# Patient Record
Sex: MALE | Race: WHITE | Employment: OTHER | URBAN - METROPOLITAN AREA
[De-identification: names, ages, dates, MRNs, and addresses within clinical notes are randomized per-mention and may not be internally consistent; named-entity substitution may affect disease eponyms.]

---

## 2024-02-29 ENCOUNTER — LAB ENCOUNTER (OUTPATIENT)
Dept: LAB | Facility: REFERENCE LAB | Age: 64
End: 2024-02-29
Attending: FAMILY MEDICINE
Payer: COMMERCIAL

## 2024-02-29 ENCOUNTER — OFFICE VISIT (OUTPATIENT)
Facility: CLINIC | Age: 64
End: 2024-02-29
Payer: COMMERCIAL

## 2024-02-29 VITALS
HEART RATE: 43 BPM | DIASTOLIC BLOOD PRESSURE: 70 MMHG | OXYGEN SATURATION: 98 % | BODY MASS INDEX: 25.61 KG/M2 | SYSTOLIC BLOOD PRESSURE: 108 MMHG | HEIGHT: 75 IN | WEIGHT: 206 LBS

## 2024-02-29 DIAGNOSIS — I83.90 VARICOSE VEINS: ICD-10-CM

## 2024-02-29 DIAGNOSIS — Z00.00 PHYSICAL EXAM, ANNUAL: ICD-10-CM

## 2024-02-29 DIAGNOSIS — Z87.891 FORMER SMOKER: ICD-10-CM

## 2024-02-29 DIAGNOSIS — Z00.00 PHYSICAL EXAM, ANNUAL: Primary | ICD-10-CM

## 2024-02-29 LAB
ALBUMIN SERPL-MCNC: 4.3 G/DL (ref 3.2–4.8)
ALBUMIN/GLOB SERPL: 1.4 {RATIO} (ref 1–2)
ALP LIVER SERPL-CCNC: 67 U/L
ALT SERPL-CCNC: 29 U/L
ANION GAP SERPL CALC-SCNC: 5 MMOL/L (ref 0–18)
AST SERPL-CCNC: 27 U/L (ref ?–34)
BASOPHILS # BLD AUTO: 0.06 X10(3) UL (ref 0–0.2)
BASOPHILS NFR BLD AUTO: 1.1 %
BILIRUB SERPL-MCNC: 0.6 MG/DL (ref 0.2–1.1)
BUN BLD-MCNC: 20 MG/DL (ref 9–23)
BUN/CREAT SERPL: 16.3 (ref 10–20)
CALCIUM BLD-MCNC: 10.2 MG/DL (ref 8.7–10.4)
CHLORIDE SERPL-SCNC: 109 MMOL/L (ref 98–112)
CHOLEST SERPL-MCNC: 142 MG/DL (ref ?–200)
CO2 SERPL-SCNC: 28 MMOL/L (ref 21–32)
COMPLEXED PSA SERPL-MCNC: 1.81 NG/ML (ref ?–4)
CREAT BLD-MCNC: 1.23 MG/DL
DEPRECATED RDW RBC AUTO: 44.5 FL (ref 35.1–46.3)
EGFRCR SERPLBLD CKD-EPI 2021: 66 ML/MIN/1.73M2 (ref 60–?)
EOSINOPHIL # BLD AUTO: 0.32 X10(3) UL (ref 0–0.7)
EOSINOPHIL NFR BLD AUTO: 5.8 %
ERYTHROCYTE [DISTWIDTH] IN BLOOD BY AUTOMATED COUNT: 12.6 % (ref 11–15)
EST. AVERAGE GLUCOSE BLD GHB EST-MCNC: 97 MG/DL (ref 68–126)
FASTING PATIENT LIPID ANSWER: NO
FASTING STATUS PATIENT QL REPORTED: NO
GLOBULIN PLAS-MCNC: 3.1 G/DL (ref 2.8–4.4)
GLUCOSE BLD-MCNC: 63 MG/DL (ref 70–99)
HBA1C MFR BLD: 5 % (ref ?–5.7)
HCT VFR BLD AUTO: 44.9 %
HDLC SERPL-MCNC: 35 MG/DL (ref 40–59)
HGB BLD-MCNC: 15.2 G/DL
IMM GRANULOCYTES # BLD AUTO: 0 X10(3) UL (ref 0–1)
IMM GRANULOCYTES NFR BLD: 0 %
LDLC SERPL CALC-MCNC: 92 MG/DL (ref ?–100)
LYMPHOCYTES # BLD AUTO: 1.47 X10(3) UL (ref 1–4)
LYMPHOCYTES NFR BLD AUTO: 26.8 %
MCH RBC QN AUTO: 32.6 PG (ref 26–34)
MCHC RBC AUTO-ENTMCNC: 33.9 G/DL (ref 31–37)
MCV RBC AUTO: 96.4 FL
MONOCYTES # BLD AUTO: 0.43 X10(3) UL (ref 0.1–1)
MONOCYTES NFR BLD AUTO: 7.8 %
NEUTROPHILS # BLD AUTO: 3.21 X10 (3) UL (ref 1.5–7.7)
NEUTROPHILS # BLD AUTO: 3.21 X10(3) UL (ref 1.5–7.7)
NEUTROPHILS NFR BLD AUTO: 58.5 %
NONHDLC SERPL-MCNC: 107 MG/DL (ref ?–130)
OSMOLALITY SERPL CALC.SUM OF ELEC: 295 MOSM/KG (ref 275–295)
PLATELET # BLD AUTO: 150 10(3)UL (ref 150–450)
POTASSIUM SERPL-SCNC: 4.7 MMOL/L (ref 3.5–5.1)
PROT SERPL-MCNC: 7.4 G/DL (ref 5.7–8.2)
RBC # BLD AUTO: 4.66 X10(6)UL
SODIUM SERPL-SCNC: 142 MMOL/L (ref 136–145)
TRIGL SERPL-MCNC: 73 MG/DL (ref 30–149)
VLDLC SERPL CALC-MCNC: 12 MG/DL (ref 0–30)
WBC # BLD AUTO: 5.5 X10(3) UL (ref 4–11)

## 2024-02-29 PROCEDURE — 99386 PREV VISIT NEW AGE 40-64: CPT | Performed by: FAMILY MEDICINE

## 2024-02-29 PROCEDURE — 85025 COMPLETE CBC W/AUTO DIFF WBC: CPT | Performed by: FAMILY MEDICINE

## 2024-02-29 PROCEDURE — 80053 COMPREHEN METABOLIC PANEL: CPT | Performed by: FAMILY MEDICINE

## 2024-02-29 PROCEDURE — 83036 HEMOGLOBIN GLYCOSYLATED A1C: CPT | Performed by: FAMILY MEDICINE

## 2024-02-29 PROCEDURE — 3074F SYST BP LT 130 MM HG: CPT | Performed by: FAMILY MEDICINE

## 2024-02-29 PROCEDURE — 3078F DIAST BP <80 MM HG: CPT | Performed by: FAMILY MEDICINE

## 2024-02-29 PROCEDURE — 3008F BODY MASS INDEX DOCD: CPT | Performed by: FAMILY MEDICINE

## 2024-02-29 PROCEDURE — 80061 LIPID PANEL: CPT | Performed by: FAMILY MEDICINE

## 2024-02-29 PROCEDURE — 84153 ASSAY OF PSA TOTAL: CPT | Performed by: FAMILY MEDICINE

## 2024-02-29 NOTE — PROGRESS NOTES
Riccardo Louis is a 63 year old male who presents for a complete physical exam.   HPI:     Left upper arm growth: Has seen derm in the past.     Has pronounced bulging veins on left leg, and lesser on right leg. Thinking about seeing specialist.     Concerns: above  Last colonoscopy:  2015 at [facility] and was rec'd to repeat in 10 years per patient.   Last PSA: 1.91 in 2023  Diet/exercise: Exercises regularly.   Vaccines- Tdap: 2023 , Shingles: to consider , PCV20: Never, Covid: to consider booster    REVIEW OF SYSTEMS:   GENERAL: feels well otherwise   SKIN: denies any unusual skin lesions  EYES:denies vision change  HEENT: no hearing changes, negative  LUNGS: denies shortness of breath with exertion  CARDIOVASCULAR: denies chest pain on exertion  GI: denies abdominal pain, constipation or diarrhea. No hematochezia or melena  : denies nocturia or changes in stream  NEURO: denies headaches  PSYCHE: denies depression or anxiety    No current outpatient medications on file.      Past Medical History:   Diagnosis Date    Anxiety     Former smoker       Past Surgical History:   Procedure Laterality Date    SKIN SURGERY        History reviewed. No pertinent family history.   Social History:  Social History     Socioeconomic History    Marital status:    Tobacco Use    Smoking status: Former     Packs/day: 1.00     Years: 25.00     Additional pack years: 0.00     Total pack years: 25.00     Types: Cigarettes     Quit date:      Years since quittin.1    Smokeless tobacco: Never    Tobacco comments:     Occasionally smoke cigars   Vaping Use    Vaping Use: Never used   Substance and Sexual Activity    Alcohol use: Never    Drug use: Never   Other Topics Concern    Caffeine Concern No    Exercise No    Seat Belt No    Special Diet No    Stress Concern No    Weight Concern No           EXAM:     Wt Readings from Last 6 Encounters:   24 206 lb (93.4 kg)     Body mass index is 25.75 kg/m².     /70   Pulse (!) 43   Ht 6' 3\" (1.905 m)   Wt 206 lb (93.4 kg)   SpO2 98%   BMI 25.75 kg/m²      GENERAL: well developed, well nourished, in no apparent distress  SKIN: no rashes, no suspicious lesions  HEENT: atraumatic, normocephalic, MMM, nose normal, Tms & EACs normal  EYES: PERRLA, EOMI, no conjunctival injection  NECK: supple, no adenopathy   LUNGS: CTA b/l, no w/r/r  CARDIO: RRR without murmur  GI: normoactive bowel sounds, NT/ND, no masses, no HSM  EXTREMITIES: no cyanosis, clubbing or edema. Large varicose veins on left leg and moreso left thigh.   NEURO: Alert & oriented, motor and sensory are grossly intact    No results found for: \"CHOLEST\", \"HDL\", \"LDL\", \"TRIGLY\", \"AST\", \"ALT\"   ASSESSMENT AND PLAN:   Riccardo Louis is a 63 year old male who presents for a complete physical exam.    Encounter Diagnoses   Name Primary?    Physical exam, annual Yes    Varicose veins     Former smoker      Orders Placed This Encounter   Procedures    Hemoglobin A1C    CBC With Differential With Platelet    Comp Metabolic Panel (14)    Lipid Panel    PSA Total, Screen       -Varicose veins: Recommend vascular surgery evaluation. Referral generated. Reviewed conservative measures as well: avoid prolonged sitting/standing, elevate legs when seated, limit salt intake, compression stockings.  -Baseline labs ordered.  -To consider Shingrix & PCV20.   -Plan for AAA screening next year.   -Medical record release form signed today to obtain colonoscopy records from [place].       Discussed with patient the following:  -Risks and benefits of screening for prostate cancer:  -Colon cancer screening   -Screening for lung cancer: Does not qualify  -Healthy diet including adequate intake of vegetables and fruits, appropriate portion sizes, minimizing highly concentrated carbohydrate foods  -Exercising 30 minutes a day most days of the week   -Importance of regular exercise and weight maintenance/loss  -Diabetes screening    -Cholesterol screening   -Recommendation for yearly influenza vaccine  -Need for Tdap once as an adult and Td booster every 10 years  -Need for Zoster vaccine for patients >= 50     Meds & Refills for this Visit:  Requested Prescriptions      No prescriptions requested or ordered in this encounter       Imaging & Consults:  VASCULAR SURGERY - INTERNAL    Return in 1 year for annual physical or sooner as needed.     Jonathan Armenta DO  02/29/24   10:35 AM

## 2024-03-06 ENCOUNTER — TELEPHONE (OUTPATIENT)
Facility: CLINIC | Age: 64
End: 2024-03-06

## 2024-03-07 ENCOUNTER — OFFICE VISIT (OUTPATIENT)
Facility: CLINIC | Age: 64
End: 2024-03-07
Payer: COMMERCIAL

## 2024-03-07 DIAGNOSIS — I83.813 VARICOSE VEINS OF BILATERAL LOWER EXTREMITIES WITH PAIN: Primary | ICD-10-CM

## 2024-03-07 NOTE — PATIENT INSTRUCTIONS
Please call and make an appointment for your compression stockings. The list below are different vendors who have compression stockings available. The ones with asterisks accept insurance.

## 2024-03-27 ENCOUNTER — HOSPITAL ENCOUNTER (OUTPATIENT)
Dept: ULTRASOUND IMAGING | Facility: HOSPITAL | Age: 64
Discharge: HOME | End: 2024-03-27
Payer: COMMERCIAL

## 2024-03-27 DIAGNOSIS — I83.813 VARICOSE VEINS OF BILATERAL LOWER EXTREMITIES WITH PAIN: ICD-10-CM

## 2024-03-27 PROCEDURE — 93970 EXTREMITY STUDY: CPT

## 2024-04-01 ENCOUNTER — TELEPHONE (OUTPATIENT)
Facility: CLINIC | Age: 64
End: 2024-04-01

## 2024-04-01 ENCOUNTER — OFFICE VISIT (OUTPATIENT)
Facility: CLINIC | Age: 64
End: 2024-04-01
Payer: COMMERCIAL

## 2024-04-01 VITALS
WEIGHT: 206 LBS | HEART RATE: 62 BPM | BODY MASS INDEX: 26 KG/M2 | SYSTOLIC BLOOD PRESSURE: 100 MMHG | DIASTOLIC BLOOD PRESSURE: 68 MMHG | TEMPERATURE: 97 F | RESPIRATION RATE: 16 BRPM | OXYGEN SATURATION: 97 %

## 2024-04-01 DIAGNOSIS — L72.9 SCALP CYST: Primary | ICD-10-CM

## 2024-04-01 PROCEDURE — 99213 OFFICE O/P EST LOW 20 MIN: CPT | Performed by: FAMILY MEDICINE

## 2024-04-01 PROCEDURE — 3078F DIAST BP <80 MM HG: CPT | Performed by: FAMILY MEDICINE

## 2024-04-01 PROCEDURE — 3074F SYST BP LT 130 MM HG: CPT | Performed by: FAMILY MEDICINE

## 2024-04-01 NOTE — PROGRESS NOTES
HPI:    Patient ID: Riccardo Louis is a 63 year old male who presents for growth on scalp.    HPI  Has cyst on vertex of scalp that has been there for years.   Recently noticed slight increase in size.   No pain.   No associated symptoms.     Past Medical History:   Diagnosis Date    Anxiety 1982    Former smoker         No current outpatient medications on file.        No Known Allergies    Review of Systems   Constitutional: Negative.    Respiratory: Negative.     Cardiovascular: Negative.    Gastrointestinal: Negative.    Neurological: Negative.    All other systems reviewed and are negative.           /68   Pulse 62   Temp 97.4 °F (36.3 °C) (Oral)   Resp 16   Wt 206 lb (93.4 kg)   SpO2 97%   BMI 25.75 kg/m²     PHYSICAL EXAM:   Physical Exam  Constitutional:       General: He is not in acute distress.     Appearance: Normal appearance. He is not ill-appearing, toxic-appearing or diaphoretic.   HENT:      Head: Normocephalic and atraumatic.      Comments: Roughly 7mm diameter round well-demarcated mobile nontender nodule on vertex of scalp. No overlying skin changes.   Pulmonary:      Effort: Pulmonary effort is normal.   Neurological:      General: No focal deficit present.      Mental Status: He is alert.   Psychiatric:         Mood and Affect: Mood normal.         Behavior: Behavior normal.         Thought Content: Thought content normal.         Judgment: Judgment normal.             ASSESSMENT/PLAN:     Encounter Diagnosis   Name Primary?    Scalp cyst Yes       1. Scalp cyst    -Likely benign thus reassurance provided.   -Plan to refer to derm if he prefers removal, although defers for now.      Meds This Visit:  Requested Prescriptions      No prescriptions requested or ordered in this encounter       Imaging & Referrals:  None       Jonathan Armenta, DO  ID#[id number]

## 2024-04-01 NOTE — TELEPHONE ENCOUNTER
The patient's wife called for an appointment because a growth was found on the top of the patient's head.

## 2024-07-18 ENCOUNTER — OFFICE VISIT (OUTPATIENT)
Facility: CLINIC | Age: 64
End: 2024-07-18
Payer: COMMERCIAL

## 2024-07-18 DIAGNOSIS — I83.813 VARICOSE VEINS OF BILATERAL LOWER EXTREMITIES WITH PAIN: Primary | ICD-10-CM

## 2024-07-18 RX ORDER — QUETIAPINE FUMARATE 25 MG/1
TABLET, FILM COATED ORAL EVERY EVENING
COMMUNITY
Start: 2024-06-03

## 2024-07-18 NOTE — PROGRESS NOTES
Samer F. Najjar, MD.  Vascular and Endovascular Surgery          VASCULAR SURGERY VISIT NOTE       Riccardo Louis   :  1960  MR#  [id number]    REFERRING PHYSICIAN:  Jonathan Armenta  PRIMARY CARE PHYSICIAN:  Jonathan Armenta DO      HPI:    The patient is a 63 year old male who is here for follow-up for his bilateral lower extremity heaviness, tiredness and pain after prolonged standing. This continues to interfere with his activities of daily living and exercise.  He was seen previously and determined to benefit from a trial of conservative therapy for venous insufficiency diagnosed clinically and after a positive venous reflux study. Conservative measures including periodic leg elevation, walking for exercise, attempts at weight loss, avoiding prolonged standing, and oral analgesics including wearing compression Grade II compression stockings (20-30 mm Hg) for greater than 6 weeks. All of these afforts have all failed to control the severe symptoms. He reports that the stockings were not helpful as his symptoms are unchanged.   He is interested in a more permanent treatment. There has been no change in his medical or surgical history since the last visit.      MEDICATIONS:     Current Outpatient Medications   Medication Sig Dispense Refill    QUEtiapine 25 MG Oral Tab Take 1-2 tablets (25-50 mg total) by mouth every evening.         ALLERGIES:   No Known Allergies    PHYSICAL EXAM:   There were no vitals taken for this visit.    Scattered varicose veins along the bilateral LE        IMPRESSION:   Bilateral lower extremity pain due to venous insufficiency.   Symptomatic calf varicosities.    PLAN:     We reviewed the options for management for venous insufficiency, including conservative therapy, medical-grade compression hose, sclerotherapy, stab phlebectomy, and endovenous laser ablation. The patient was again educated in the benign condition of venous disease.  Given that the patient has worn the  compression stockings for the past several weeks without improvement, I have recommended endovenous therapy with radiofrequency ablation (RFA) for his refluxing veins (B GSV and B LSV) in addition to the possible need for stab phlebectomies. We discussed the risks of DVT/PE/death, bleeding, allergic reaction, nerve injury, infection, hyperpigmentation, chronic pain, recurrence and need for further interventions among other complications.   The patient understood and agreed to proceed with this treatment plan. All of his questions were answered during this clinic visit.   He wishes to proceed once approval is attained.    Thank you for allowing to participate in the care of your patients . Please do not hesitate to contact my office with any questions.      Sincerely,  Samer F. Najjar MD  Division of Vascular Surgery

## 2024-08-06 ENCOUNTER — TELEPHONE (OUTPATIENT)
Facility: CLINIC | Age: 64
End: 2024-08-06

## 2024-08-06 NOTE — TELEPHONE ENCOUNTER
Verified coverage. Patient is active with [employer] [place] of [place]. Prior authorization is not required per [employer].     Confirmed Dr. Najjar is not out of network.

## 2024-09-10 ENCOUNTER — TELEPHONE (OUTPATIENT)
Facility: CLINIC | Age: 64
End: 2024-09-10

## 2024-09-10 NOTE — TELEPHONE ENCOUNTER
Spoke with patient. Procedure dates confirmed. Patient is also aware that he will need post procedure scans after each procedure. Instructions for vein ablations sent via [employer].

## 2024-09-10 NOTE — TELEPHONE ENCOUNTER
Patient called back. Have tentative dates for vein procedures. He will speak with his wife and call me back to either confirm or change one.

## 2024-10-11 ENCOUNTER — OFFICE VISIT (OUTPATIENT)
Facility: CLINIC | Age: 64
End: 2024-10-11
Payer: COMMERCIAL

## 2024-10-11 DIAGNOSIS — I83.813 VARICOSE VEINS OF BILATERAL LOWER EXTREMITIES WITH PAIN: Primary | ICD-10-CM

## 2024-10-11 NOTE — PROCEDURES
Samer F. Najjar, MD  Vascular Surgery  [facility]         Endovenous Radio Frequency Ablation (RFA) Therapy Operative Report    PATIENT:  Riccardo Louis   : 1960     SURGEON: Samer F Najjar, MD     Pre-Op Diagnosis: Symptomatic Varicose Veins     Post-Op Diagnosis: Same    Date of Procedure: 10/11/2024     Procedure: Radio Frequency Endovascular ablation of the Left Greater Saphenous Vein     The skin of the leg was prepped in the usual sterile fashion.  Ultrasound guidance was used throughout the procedure with a Chris iu22/I Gotchuan.  The gretaer saphenous vein was cannulated using a micropuncture system. A 7F sheath was placed advanced over the wire.  The RFA catheter fiber was passed through the sheath and placed to the appropriate position but at least 5 centimeters from the deep vein.  Using tumescent anesthesia the entire sheathed portion of the vein was anesthetized assuring that there was at least 1 cm between the vein and the skin surface.  The entire length of the vein was treated using 6 cycles of the RFA.  Duplex imaging confirmed closure of the accessory saphenous vein with no evidence for DVT.    Steri strips and sterile dressing were applied. The patient tolerated the procedure well.    Cannulation Site: upper calf      Treatment Length: 55 cm   Cycles: 9     Treatment Time: 3 min      Surgeon Signature: Samer F Najjar, MD

## 2024-10-14 ENCOUNTER — NURSE ONLY (OUTPATIENT)
Facility: CLINIC | Age: 64
End: 2024-10-14
Payer: COMMERCIAL

## 2024-10-14 DIAGNOSIS — I83.813 VARICOSE VEINS OF BOTH LOWER EXTREMITIES WITH PAIN: ICD-10-CM

## 2024-11-08 ENCOUNTER — OFFICE VISIT (OUTPATIENT)
Facility: CLINIC | Age: 64
End: 2024-11-08
Payer: COMMERCIAL

## 2024-11-08 DIAGNOSIS — I83.813 VARICOSE VEINS OF BOTH LOWER EXTREMITIES WITH PAIN: Primary | ICD-10-CM

## 2024-11-08 NOTE — PROCEDURES
Samer F. Najjar, MD  Vascular Surgery  [facility]       Endovenous Radio Frequency Ablation (RFA) Therapy Operative Report    PATIENT:  Riccardo Louis   : 1960     SURGEON: Samer F Najjar, MD     Pre-Op Diagnosis: Symptomatic Varicose Veins     Post-Op Diagnosis: Same    Date of Procedure: 2024     Procedure: Radio Frequency Endovascular ablation of the Left  Lesser Saphenous Vein     The skin of the leg was prepped in the usual sterile fashion.  Ultrasound guidance was used throughout the procedure with a Chris iu22/uControlan.  The lesser saphenous vein was cannulated using a micropuncture system. A 7F sheath was placed advanced over the wire.  The RFA catheter fiber was passed through the sheath and placed to the appropriate position but at least 5 centimeters from the deep vein.  Using tumescent anesthesia the entire sheathed portion of the vein was anesthetized assuring that there was at least 1 cm between the vein and the skin surface.  The entire length of the vein was treated using 6 cycles of the RFA.  Duplex imaging confirmed closure of the accessory saphenous vein with no evidence for DVT.    Steri strips and sterile dressing were applied. The patient tolerated the procedure well.    Cannulation Site: distal calf      Treatment Length: 36 cm   Cycles: 7     Treatment Time: 140 sec      Surgeon Signature: Samer F Najjar, MD

## 2024-11-12 ENCOUNTER — NURSE ONLY (OUTPATIENT)
Facility: CLINIC | Age: 64
End: 2024-11-12
Payer: COMMERCIAL

## 2024-11-12 DIAGNOSIS — I83.813 VARICOSE VEINS OF BOTH LOWER EXTREMITIES WITH PAIN: ICD-10-CM

## 2024-11-19 ENCOUNTER — OFFICE VISIT (OUTPATIENT)
Facility: CLINIC | Age: 64
End: 2024-11-19
Payer: COMMERCIAL

## 2024-11-19 DIAGNOSIS — I83.813 VARICOSE VEINS OF BOTH LOWER EXTREMITIES WITH PAIN: Primary | ICD-10-CM

## 2024-11-19 NOTE — PROCEDURES
Samer F. Najjar, MD  Vascular Surgery  [facility]         Endovenous Radio Frequency Ablation (RFA) Therapy Operative Report    PATIENT:  Riccardo Louis   : 1960     SURGEON: Samer F Najjar, MD     Pre-Op Diagnosis: Symptomatic Varicose Veins     Post-Op Diagnosis: Same    Date of Procedure: 2024     Procedure: Radio Frequency Endovascular ablation of the Right  Greater Saphenous Vein     The skin of the leg was prepped in the usual sterile fashion.  Ultrasound guidance was used throughout the procedure with a Chris iu22/[employer]an.  The gretaer saphenous vein was cannulated using a micropuncture system. A 7F sheath was placed advanced over the wire.  The RFA catheter fiber was passed through the sheath and placed to the appropriate position but at least 5 centimeters from the deep vein.  Using tumescent anesthesia the entire sheathed portion of the vein was anesthetized assuring that there was at least 1 cm between the vein and the skin surface.  The entire length of the vein was treated using 6 cycles of the RFA.  Duplex imaging confirmed closure of the accessory saphenous vein with no evidence for DVT.    Steri strips and sterile dressing were applied. The patient tolerated the procedure well.    Cannulation Site: CALF      Treatment Length: 60 cm   Cycles: 8     Treatment Time: 2min:40 sec      Surgeon Signature: Samer F Najjar, MD

## 2024-11-22 ENCOUNTER — NURSE ONLY (OUTPATIENT)
Facility: CLINIC | Age: 64
End: 2024-11-22

## 2024-11-22 DIAGNOSIS — I83.813 VARICOSE VEINS OF BOTH LOWER EXTREMITIES WITH PAIN: ICD-10-CM

## 2024-12-06 ENCOUNTER — OFFICE VISIT (OUTPATIENT)
Facility: CLINIC | Age: 64
End: 2024-12-06
Payer: MEDICARE

## 2024-12-06 DIAGNOSIS — I83.813 VARICOSE VEINS OF BOTH LOWER EXTREMITIES WITH PAIN: Primary | ICD-10-CM

## 2024-12-06 NOTE — PROCEDURES
Samer F. Najjar, MD  Vascular Surgery  [facility]       Endovenous Radio Frequency Ablation (RFA) Therapy Operative Report    PATIENT:  Riccardo Louis   : 1960     SURGEON: Samer F Najjar, MD     Pre-Op Diagnosis: Symptomatic Varicose Veins     Post-Op Diagnosis: Same    Date of Procedure: 2024     Procedure: Radio Frequency Endovascular ablation of the Right  Lesser Saphenous Vein     The skin of the leg was prepped in the usual sterile fashion.  Ultrasound guidance was used throughout the procedure with a Chris iu22/DriveHQan.  The lesser saphenous vein was cannulated using a micropuncture system. A 7F sheath was placed advanced over the wire.  The RFA catheter fiber was passed through the sheath and placed to the appropriate position but at least 5 centimeters from the deep vein.  Using tumescent anesthesia the entire sheathed portion of the vein was anesthetized assuring that there was at least 1 cm between the vein and the skin surface.  The entire length of the vein was treated using 6 cycles of the RFA.  Duplex imaging confirmed closure of the accessory saphenous vein with no evidence for DVT.    Steri strips and sterile dressing were applied. The patient tolerated the procedure well.    Cannulation Site: distal calf      Treatment Length: 20 cm   Cycles: 8     Treatment Time: 160 sec      Surgeon Signature: Samer F Najjar, MD

## 2024-12-09 ENCOUNTER — NURSE ONLY (OUTPATIENT)
Facility: CLINIC | Age: 64
End: 2024-12-09

## 2024-12-09 DIAGNOSIS — I83.813 VARICOSE VEINS OF BOTH LOWER EXTREMITIES WITH PAIN: ICD-10-CM

## 2024-12-18 ENCOUNTER — OFFICE VISIT (OUTPATIENT)
Facility: CLINIC | Age: 64
End: 2024-12-18

## 2024-12-18 VITALS — WEIGHT: 206 LBS | HEIGHT: 75 IN | BODY MASS INDEX: 25.61 KG/M2

## 2024-12-18 DIAGNOSIS — I83.813 VARICOSE VEINS OF BILATERAL LOWER EXTREMITIES WITH PAIN: Primary | ICD-10-CM

## 2024-12-18 RX ORDER — CLONAZEPAM 0.5 MG/1
0.5 TABLET ORAL NIGHTLY
COMMUNITY
Start: 2024-11-21

## 2024-12-18 NOTE — PROGRESS NOTES
VASCULAR SURGERY OFFICE NOTE    2024    Name: Riccardo Louis   : 1960  [id number]     REASON FOR VISIT:   Patient is a 63 year old male who is here for his follow up visit for RFA of the  bilateral GSV and SSV (Left GSV 10/11/24, Right GSV 24, Left SSV 24, Right SSV 24). Has done well at home.  He reports improving pain. Denies any new erythema at the access sites. The postop ultrasounds revealed no deep venous thrombosis with occlusion of the treated veins.  The patient has been wearing his compression stockings as instructed.    PAST MEDICAL HISTORY:     Past Medical History:    Anxiety    Former smoker       PAST SURGICAL HISTORY:     Past Surgical History:   Procedure Laterality Date    Colonoscopy  2015    At [place] showing sigmoid diverticulosis. Rec'd repeat in 10 years.    Skin surgery          MEDICATIONS:     Current Outpatient Medications   Medication Sig Dispense Refill    clonazePAM 0.5 MG Oral Tab Take 1 tablet (0.5 mg total) by mouth nightly.      QUEtiapine 25 MG Oral Tab Take 1-2 tablets (25-50 mg total) by mouth every evening.         EXAM:   Ht 6' 3\" (1.905 m)   Wt 206 lb (93.4 kg)   BMI 25.75 kg/m²   The  bilateral calf access sites have healed well with no erythema.       ASSESSMENT AND PLAN:   Successful treatment of the venous insufficiency with no complications.   The patient is very satisfied and will see me on an as needed basis.    HOUSTON Peterson  Division of Vascular Surgery.

## 2025-01-16 ENCOUNTER — TELEPHONE (OUTPATIENT)
Facility: CLINIC | Age: 65
End: 2025-01-16

## 2025-01-16 NOTE — TELEPHONE ENCOUNTER
Patient was called as requested. Left message on his VM to call HOUSTON Crowley back regarding new symptoms.

## 2025-01-16 NOTE — TELEPHONE ENCOUNTER
Patient was again called. Reports pain and swelling around the left knee area. Advised him this issue does not seem to be vascular related. His post-op ultrasound revealed no deep vein thrombosis with total occlusion of the treated veins in the left lower extremity. He was instructed to schedule appt with his PCP to rule out knee effusion, gouty arthritis, among other conditions.

## (undated) NOTE — LETTER
Medical Grade Compression Hose        Patient: Riccardo Louis      YOB: 1960           Diagnosis: Varicose Veins with Pain- Left Leg: I83.812       Compression: 20-30mmHg- Moderate  Style: Thigh-High        Amount: X 2  HCPS: - Thigh High          Physician Signature: _____________________  Date:  03/07/24                                       TR Peterson